# Patient Record
Sex: FEMALE | Race: WHITE | URBAN - METROPOLITAN AREA
[De-identification: names, ages, dates, MRNs, and addresses within clinical notes are randomized per-mention and may not be internally consistent; named-entity substitution may affect disease eponyms.]

---

## 2021-12-18 ENCOUNTER — OFFICE VISIT (OUTPATIENT)
Dept: URBAN - METROPOLITAN AREA CLINIC 44 | Facility: CLINIC | Age: 71
End: 2021-12-18
Payer: OTHER MISCELLANEOUS

## 2021-12-18 DIAGNOSIS — H25.813 COMBINED FORMS OF AGE-RELATED CATARACT, BILATERAL: ICD-10-CM

## 2021-12-18 DIAGNOSIS — H04.123 TEAR FILM INSUFFICIENCY OF BILATERAL LACRIMAL GLANDS: ICD-10-CM

## 2021-12-18 DIAGNOSIS — H33.193 OTHER BILATERAL RETINOSCHISIS: Primary | ICD-10-CM

## 2021-12-18 DIAGNOSIS — H43.313 VITREOUS MEMBRANES AND STRANDS, BILATERAL: ICD-10-CM

## 2021-12-18 PROCEDURE — 92134 CPTRZ OPH DX IMG PST SGM RTA: CPT | Performed by: OPTOMETRIST

## 2021-12-18 PROCEDURE — 99204 OFFICE O/P NEW MOD 45 MIN: CPT | Performed by: OPTOMETRIST

## 2021-12-18 ASSESSMENT — VISUAL ACUITY
OD: 20/25
OS: 20/30

## 2021-12-18 ASSESSMENT — KERATOMETRY
OD: 45.25
OS: 45.38

## 2021-12-18 ASSESSMENT — INTRAOCULAR PRESSURE
OD: 18
OS: 20

## 2021-12-18 NOTE — IMPRESSION/PLAN
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 Inferior temporal OU. Patient asymptomatic Plan: PLAN: Discussed findings. S/S of RD reviewed. RTC PRN.

## 2021-12-18 NOTE — IMPRESSION/PLAN
Impression: Combined forms of age-related cataract, bilateral: H25.813. Visually significant/symptomatic cataracts. BCVA OD 20/25, OS 20/30. Glare 20/60 OU. Plan: Discussed cataract findings and treatment options with patient. Rec CE/IOL to improve vision. Patient wishes to proceed with surgery. R/A/B to be discussed. OS first followed by OD. Patient is a candidate for Multifocal, toric, and standard IOL. Goal is plano and patient understand they may need glasses for near vision.  Discussed vision maybe limited by DED